# Patient Record
Sex: FEMALE | Race: BLACK OR AFRICAN AMERICAN | NOT HISPANIC OR LATINO | URBAN - METROPOLITAN AREA
[De-identification: names, ages, dates, MRNs, and addresses within clinical notes are randomized per-mention and may not be internally consistent; named-entity substitution may affect disease eponyms.]

---
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
## 2022-05-18 ENCOUNTER — EMERGENCY (EMERGENCY)
Facility: HOSPITAL | Age: 22
LOS: 1 days | Discharge: HOME | End: 2022-05-18
Attending: EMERGENCY MEDICINE
Payer: COMMERCIAL

## 2022-05-18 VITALS
DIASTOLIC BLOOD PRESSURE: 78 MMHG | SYSTOLIC BLOOD PRESSURE: 120 MMHG | HEART RATE: 60 BPM | RESPIRATION RATE: 18 BRPM | TEMPERATURE: 98 F

## 2022-05-18 VITALS
TEMPERATURE: 98 F | DIASTOLIC BLOOD PRESSURE: 85 MMHG | SYSTOLIC BLOOD PRESSURE: 123 MMHG | HEART RATE: 63 BPM | RESPIRATION RATE: 17 BRPM | OXYGEN SATURATION: 99 %

## 2022-05-18 DIAGNOSIS — F43.25 ADJUSTMENT DISORDER WITH MIXED DISTURBANCE OF EMOTIONS AND CONDUCT: ICD-10-CM

## 2022-05-18 LAB
ALBUMIN SERPL ELPH-MCNC: 4.5 G/DL — SIGNIFICANT CHANGE UP (ref 3.3–5.2)
ALP SERPL-CCNC: 72 U/L — SIGNIFICANT CHANGE UP (ref 40–120)
ALT FLD-CCNC: 10 U/L — SIGNIFICANT CHANGE UP
AMPHET UR-MCNC: NEGATIVE — SIGNIFICANT CHANGE UP
ANION GAP SERPL CALC-SCNC: 15 MMOL/L — SIGNIFICANT CHANGE UP (ref 5–17)
APAP SERPL-MCNC: <3 UG/ML — LOW (ref 10–26)
APPEARANCE UR: CLEAR — SIGNIFICANT CHANGE UP
AST SERPL-CCNC: 14 U/L — SIGNIFICANT CHANGE UP
BACTERIA # UR AUTO: ABNORMAL
BARBITURATES UR SCN-MCNC: NEGATIVE — SIGNIFICANT CHANGE UP
BASOPHILS # BLD AUTO: 0.05 K/UL — SIGNIFICANT CHANGE UP (ref 0–0.2)
BASOPHILS NFR BLD AUTO: 0.5 % — SIGNIFICANT CHANGE UP (ref 0–2)
BENZODIAZ UR-MCNC: NEGATIVE — SIGNIFICANT CHANGE UP
BILIRUB SERPL-MCNC: 0.3 MG/DL — LOW (ref 0.4–2)
BILIRUB UR-MCNC: NEGATIVE — SIGNIFICANT CHANGE UP
BUN SERPL-MCNC: 9.2 MG/DL — SIGNIFICANT CHANGE UP (ref 8–20)
CALCIUM SERPL-MCNC: 9.4 MG/DL — SIGNIFICANT CHANGE UP (ref 8.6–10.2)
CHLORIDE SERPL-SCNC: 101 MMOL/L — SIGNIFICANT CHANGE UP (ref 98–107)
CO2 SERPL-SCNC: 22 MMOL/L — SIGNIFICANT CHANGE UP (ref 22–29)
COCAINE METAB.OTHER UR-MCNC: NEGATIVE — SIGNIFICANT CHANGE UP
COLOR SPEC: YELLOW — SIGNIFICANT CHANGE UP
CREAT SERPL-MCNC: 0.88 MG/DL — SIGNIFICANT CHANGE UP (ref 0.5–1.3)
DIFF PNL FLD: ABNORMAL
EGFR: 96 ML/MIN/1.73M2 — SIGNIFICANT CHANGE UP
EOSINOPHIL # BLD AUTO: 0.08 K/UL — SIGNIFICANT CHANGE UP (ref 0–0.5)
EOSINOPHIL NFR BLD AUTO: 0.8 % — SIGNIFICANT CHANGE UP (ref 0–6)
EPI CELLS # UR: ABNORMAL
ETHANOL SERPL-MCNC: <10 MG/DL — SIGNIFICANT CHANGE UP (ref 0–9)
GLUCOSE SERPL-MCNC: 84 MG/DL — SIGNIFICANT CHANGE UP (ref 70–99)
GLUCOSE UR QL: NEGATIVE MG/DL — SIGNIFICANT CHANGE UP
HCG SERPL-ACNC: <4 MIU/ML — SIGNIFICANT CHANGE UP
HCT VFR BLD CALC: 40.1 % — SIGNIFICANT CHANGE UP (ref 34.5–45)
HGB BLD-MCNC: 13.6 G/DL — SIGNIFICANT CHANGE UP (ref 11.5–15.5)
HIV 1 & 2 AB SERPL IA.RAPID: SIGNIFICANT CHANGE UP
IMM GRANULOCYTES NFR BLD AUTO: 0.2 % — SIGNIFICANT CHANGE UP (ref 0–1.5)
KETONES UR-MCNC: ABNORMAL
LEUKOCYTE ESTERASE UR-ACNC: ABNORMAL
LYMPHOCYTES # BLD AUTO: 2.52 K/UL — SIGNIFICANT CHANGE UP (ref 1–3.3)
LYMPHOCYTES # BLD AUTO: 24.4 % — SIGNIFICANT CHANGE UP (ref 13–44)
MCHC RBC-ENTMCNC: 29.8 PG — SIGNIFICANT CHANGE UP (ref 27–34)
MCHC RBC-ENTMCNC: 33.9 GM/DL — SIGNIFICANT CHANGE UP (ref 32–36)
MCV RBC AUTO: 87.7 FL — SIGNIFICANT CHANGE UP (ref 80–100)
METHADONE UR-MCNC: NEGATIVE — SIGNIFICANT CHANGE UP
MONOCYTES # BLD AUTO: 0.79 K/UL — SIGNIFICANT CHANGE UP (ref 0–0.9)
MONOCYTES NFR BLD AUTO: 7.6 % — SIGNIFICANT CHANGE UP (ref 2–14)
NEUTROPHILS # BLD AUTO: 6.88 K/UL — SIGNIFICANT CHANGE UP (ref 1.8–7.4)
NEUTROPHILS NFR BLD AUTO: 66.5 % — SIGNIFICANT CHANGE UP (ref 43–77)
NITRITE UR-MCNC: NEGATIVE — SIGNIFICANT CHANGE UP
OPIATES UR-MCNC: NEGATIVE — SIGNIFICANT CHANGE UP
PCP SPEC-MCNC: SIGNIFICANT CHANGE UP
PCP UR-MCNC: NEGATIVE — SIGNIFICANT CHANGE UP
PH UR: 7 — SIGNIFICANT CHANGE UP (ref 5–8)
PLATELET # BLD AUTO: 225 K/UL — SIGNIFICANT CHANGE UP (ref 150–400)
POTASSIUM SERPL-MCNC: 3.8 MMOL/L — SIGNIFICANT CHANGE UP (ref 3.5–5.3)
POTASSIUM SERPL-SCNC: 3.8 MMOL/L — SIGNIFICANT CHANGE UP (ref 3.5–5.3)
PROT SERPL-MCNC: 7.9 G/DL — SIGNIFICANT CHANGE UP (ref 6.6–8.7)
PROT UR-MCNC: 30 MG/DL
RBC # BLD: 4.57 M/UL — SIGNIFICANT CHANGE UP (ref 3.8–5.2)
RBC # FLD: 12.8 % — SIGNIFICANT CHANGE UP (ref 10.3–14.5)
RBC CASTS # UR COMP ASSIST: SIGNIFICANT CHANGE UP /HPF (ref 0–4)
SALICYLATES SERPL-MCNC: <0.6 MG/DL — LOW (ref 10–20)
SARS-COV-2 RNA SPEC QL NAA+PROBE: SIGNIFICANT CHANGE UP
SODIUM SERPL-SCNC: 138 MMOL/L — SIGNIFICANT CHANGE UP (ref 135–145)
SP GR SPEC: 1.01 — SIGNIFICANT CHANGE UP (ref 1.01–1.02)
THC UR QL: POSITIVE
UROBILINOGEN FLD QL: NEGATIVE MG/DL — SIGNIFICANT CHANGE UP
WBC # BLD: 10.34 K/UL — SIGNIFICANT CHANGE UP (ref 3.8–10.5)
WBC # FLD AUTO: 10.34 K/UL — SIGNIFICANT CHANGE UP (ref 3.8–10.5)
WBC UR QL: SIGNIFICANT CHANGE UP /HPF (ref 0–5)

## 2022-05-18 PROCEDURE — 36415 COLL VENOUS BLD VENIPUNCTURE: CPT

## 2022-05-18 PROCEDURE — 93005 ELECTROCARDIOGRAM TRACING: CPT

## 2022-05-18 PROCEDURE — 85025 COMPLETE CBC W/AUTO DIFF WBC: CPT

## 2022-05-18 PROCEDURE — 84702 CHORIONIC GONADOTROPIN TEST: CPT

## 2022-05-18 PROCEDURE — U0003: CPT

## 2022-05-18 PROCEDURE — 90792 PSYCH DIAG EVAL W/MED SRVCS: CPT

## 2022-05-18 PROCEDURE — 81001 URINALYSIS AUTO W/SCOPE: CPT

## 2022-05-18 PROCEDURE — 99285 EMERGENCY DEPT VISIT HI MDM: CPT

## 2022-05-18 PROCEDURE — 86703 HIV-1/HIV-2 1 RESULT ANTBDY: CPT

## 2022-05-18 PROCEDURE — U0005: CPT

## 2022-05-18 PROCEDURE — 80053 COMPREHEN METABOLIC PANEL: CPT

## 2022-05-18 PROCEDURE — 80307 DRUG TEST PRSMV CHEM ANLYZR: CPT

## 2022-05-18 PROCEDURE — 93010 ELECTROCARDIOGRAM REPORT: CPT

## 2022-05-18 NOTE — CHART NOTE - NSCHARTNOTEFT_GEN_A_CORE
SW Note: SW made aware by  provider pt is T&R, would benefit from outpt f/u. SW met with pt at bedside, pt in agreement with [facility] referral, HIPAA consent signed. Pt aware appt is via phone, # for appt given is [phone] (pt's bf cell [facility]), Appt made for tomorrow, 5/19 at 2:30pm. Pt aware to expect a call tomorrow approx 2:30pm. Pt provided with appt card and brochure, no further SW services identified at this time

## 2022-05-18 NOTE — ED BEHAVIORAL HEALTH ASSESSMENT NOTE - SUMMARY
Patient is a 22  year old, female; domiciled with bf of several months; never ; noncaregiver; unemployed; has been on food stamps, denies any formal  past psychiatric history ; no psychiatric  hospitalizations; no known suicide attempts; no known history of violence or arrests; no h/o self cutting, reports daily cannabis use, denies any other active substance abuse or known history of complicated withdrawal; denies PMhx  brought in by [employer] after patient reportedly made suicidal statement to police.   Patient with adjustment disorder with disturbance of emotion and conduct.  Patient reportedly made suicidal statement when she was upset because she was not allowed to see foster sister in [facility].  Patient denies suicidal intent. Reports upset about death of grandmother in 2020 due to accidental overdose.  Denies S/H I/I/P. No other acute psychiatric concerns were elicited. Boyfriend had no safety concerns and corroborated pt's account.  Patient is cleared for outpatient tx. Referred to [facility]
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
## 2022-05-18 NOTE — ED PROVIDER NOTE - CLINICAL SUMMARY MEDICAL DECISION MAKING FREE TEXT BOX
21 y/o F presents after making suicidal gesture and comment earlier, now regrets and states she doesn't feel that way. Will medically clear, psych evaluation.

## 2022-05-18 NOTE — ED ADULT NURSE REASSESSMENT NOTE - NS ED NURSE REASSESS COMMENT FT1
Patient denies suicidal or homicidal ideations.  Patient reviewed discharge instructions and provided a copy of same.  Patient agrees to return to a local ED or call 911 if symptoms worsen or thoughts to harm self or others develop.
Patient ate snacks provided.  Patient aware of plan for discharge and agrees.  Patient accepted followup at [facility].  Awaiting transportation home.

## 2022-05-18 NOTE — ED BEHAVIORAL HEALTH ASSESSMENT NOTE - RISK ASSESSMENT
Low: Patient denies h/o prior suicide attempts, denies current S/H I/I/P, patient reportedly made statement out of frustration, denies suicidal intent, denies depressive sx's, denies global insomnia, denies panic attacks, remains future oriented, has support, willing to engage in treatment, reports cannabis use, but denies any other substance use,. Low Acute Suicide Risk

## 2022-05-18 NOTE — ED ADULT NURSE NOTE - OBJECTIVE STATEMENT
Patient presented in  area dressed in casual attire.  Patient reports she went to [facility] to visit her "sister"  but she was not permitted to visit because the patients mother does not like her.  Patient reports she left angry and was attempting to get to the train station to return to her home in [place].  Patient reports she had to walk by the highway because the nurse wouldn't let her use the other exit out of [facility].  Patient denies making suicidal statement to police but admits she was angry at time of interaction.  Patient reports she lives alone and is currently no working and is financially supported by "nice people".  Patient denies any psychiatric past or current treatment.  Patient denies suicidal or homicidal ideations.  Cooperative with security contraband assessment and securing belongings in  locker.   Patient did request pregnancy test expressing concern she may be pregnant.

## 2022-05-18 NOTE — ED PROVIDER NOTE - OBJECTIVE STATEMENT
21 y/o F with no PMH presents for suicidal thoughts. She was brought in by [place] police after a motorist called for a young woman in the middle of [address]. Patient made suicidal statements to the police, but denies them now, states she said that, but didn't mean it, and just wants to go home. She said she was trying to see her sister who is in [facility] at this time. She denies AVH, SI or HI. She denies allergies to medications, denies smoking, EtOH or illicit drugs.

## 2022-05-18 NOTE — ED ADULT TRIAGE NOTE - CHIEF COMPLAINT QUOTE
pt presents to ED c/o thoughts of wanting to harm herself, denies plan.  Patient undressed and placed in yellow gown, belongings secured.
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
## 2022-05-18 NOTE — ED PROVIDER NOTE - PATIENT PORTAL LINK FT
You can access the FollowMyHealth Patient Portal offered by [facility] by registering at the following website: [email or web address]. By joining [employer]’s FollowMyHealth portal, you will also be able to view your health information using other applications (apps) compatible with our system.

## 2022-05-18 NOTE — ED BEHAVIORAL HEALTH ASSESSMENT NOTE - SAFETY PLAN ADDT'L DETAILS
Education provided regarding environmental safety / lethal means restriction/Provision of National Suicide Prevention Lifeline 6-263-743-TALK (2091)

## 2022-05-18 NOTE — ED PROVIDER NOTE - NS ED ROS FT
Const: Denies fever, chills  HEENT: Denies blurry vision, sore throat  Neck: Denies neck pain/stiffness  Resp: Denies coughing, SOB  Cardiovascular: Denies CP, palpitations, LE edema  GI: Denies nausea, vomiting, abdominal pain, diarrhea, constipation, blood in stool  : Denies urinary frequency/urgency/dysuria, hematuria  MSK: Denies back pain  Neuro: Denies HA, dizziness, numbness, weakness  Skin: Denies rashes.   Psych: Denies SI/HI/AVH.

## 2022-05-18 NOTE — ED BEHAVIORAL HEALTH ASSESSMENT NOTE - DETAILS
Please go to Nearest ER or call 911, If you notice any of the followin) Agitation, Aggression or Anxiety,    2) Suicidal or homicidal thoughts 3) Worsening of symptoms NA see HPI

## 2022-12-05 NOTE — ED PROVIDER NOTE - NSFOLLOWUPINSTRUCTIONS_ED_ALL_ED_FT
Detail Level: Simple
Additional Notes: Patient consent was obtained to proceed with the visit and recommended plan of care after discussion of all risks and benefits, including the risks of COVID-19 exposure.
Suicidal Feelings: How to Help Yourself      Suicide is when you end your own life. There are many things you can do to help yourself feel better when struggling with these feelings. Many services and people are available to support you and others who struggle with similar feelings.     If you ever feel like you may hurt yourself or others, or have thoughts about taking your own life, get help right away. To get help:   • Call your local emergency services (911 in the [place]       • The [facility], [facility] and [facility] services helpline (211 in the [place]       • Go to your nearest emergency department.     • Call a suicide hotline to speak with a trained counselor. The following suicide hotlines are available in the [place]:  •8-085-395-TALK (1-299.641.1695).      •2-907-RVNQYEF (1-159.373.8062).      •1-200.488.7796. This is a hotline for Amharic speakers.      •1-388.892.2416. This is a hotline for TTY users.      •9-265-1-U-CARLOS (1-155.759.5381). This is a hotline for lesbian, ley, bisexual, transgender, or questioning youth.      •For a list of hotlines in [place], visit www.suicide.org/hotlines/international/isxnak-ihcbrli-ksamilnf.html      • Contact a crisis center or a local suicide prevention center. To find a crisis center or suicide prevention center:  •Call your local hospital, clinic, community service organization, mental health center, social service provider, or health department. Ask for help with connecting to a crisis center.      •For a list of crisis centers in the [place], visit: suicidepreventionlifeline.org      •For a list of crisis centers in [place], visit: suicideprevention.ca          How to help yourself feel better     •Promise yourself that you will not do anything extreme when you have suicidal feelings. Remember the times you have felt hopeful. Many people have gotten through suicidal thoughts and feelings, and you can too. If you have had these feelings before, remind yourself that you can get through them again.      •Let family, friends, teachers, or counselors know how you are feeling. Try not to separate yourself from those who care about you and want to help you. Talk with someone every day, even if you do not feel sociable. Face-to-face conversation is best to help them understand your feelings.      •Contact a mental health care provider and work with this person regularly.      •Make a safety plan that you can follow during a crisis. Include phone numbers of suicide prevention hotlines, mental health professionals, and trusted friends and family members you can call during an emergency. Save these numbers on your phone.      •If you are thinking of taking a lot of medicine, give your medicine to someone who can give it to you as prescribed. If you are on antidepressants and are concerned you will overdose, tell your health care provider so that he or she can give you safer medicines.      •Try to stick to your routines and follow a schedule every day. Make self-care a priority.      •Make a list of realistic goals, and cross them off when you achieve them. Accomplishments can give you a sense of worth.      •Wait until you are feeling better before doing things that you find difficult or unpleasant.      •Do things that you have always enjoyed to take your mind off your feelings. Try reading a book, or listening to or playing music. Spending time outside, in nature, may help you feel better.        Follow these instructions at home:     •Visit your primary health care provider every year for a checkup.      •Work with a mental health care provider as needed.      •Eat a well-balanced diet, and eat regular meals.      •Get plenty of rest.      •Exercise if you are able. Just 30 minutes of exercise each day can help you feel better.      •Take over-the-counter and prescription medicines only as told by your health care provider. Ask your mental health care provider about the possible side effects of any medicines you are taking.      • Do not use alcohol or drugs, and remove these substances from your home.      •Remove weapons, poisons, knives, and other deadly items from your home.        General recommendations    •Keep your living space well lit.      •When you are feeling well, write yourself a letter with tips and support that you can read when you are not feeling well.      •Remember that life's difficulties can be sorted out with help. Conditions can be treated, and you can learn behaviors and ways of thinking that will help you.        Where to find more information    •National Suicide Prevention Lifeline: www.suicidepreventionlifeline.org      •Hopeline: www.hopeline.com      •American Foundation for Suicide Prevention: www.afsp.org      •The [employer] Project (for lesbian, ley, bisexual, transgender, or questioning youth): www.thetrevorproject.org      •National Welcome of Mental Health: https://www.nimh.nih.gov/health/topics/suicide-prevention        Contact a health care provider if:    •You feel as though you are a burden to others.      •You feel agitated, angry, vengeful, or have extreme mood swings.      •You have withdrawn from family and friends.        Get help right away if:    •You are talking about suicide or wishing to die.      •You start making plans for how to commit suicide.      •You feel that you have no reason to live.      •You start making plans for putting your affairs in order, saying goodbye, or giving your possessions away.      •You feel guilt, shame, or unbearable pain, and it seems like there is no way out.      •You are frequently using drugs or alcohol.      •You are engaging in risky behaviors that could lead to death.      If you have any of these symptoms, get help right away. Call emergency services, go to your nearest emergency department or crisis center, or call a suicide crisis helpline.       Summary    •Suicide is when you take your own life.      •Promise yourself that you will not do anything extreme when you have suicidal feelings.      •Let family, friends, teachers, or counselors know how you are feeling.      •Get help right away if you start making plans for how to commit suicide.      This information is not intended to replace advice given to you by your health care provider. Make sure you discuss any questions you have with your health care provider.
